# Patient Record
Sex: MALE | Race: WHITE | NOT HISPANIC OR LATINO | URBAN - METROPOLITAN AREA
[De-identification: names, ages, dates, MRNs, and addresses within clinical notes are randomized per-mention and may not be internally consistent; named-entity substitution may affect disease eponyms.]

---

## 2022-07-19 PROBLEM — E66.3 OVERWEIGHT: Status: ACTIVE | Noted: 2019-12-18

## 2023-08-17 ENCOUNTER — OFFICE (OUTPATIENT)
Dept: URBAN - METROPOLITAN AREA CLINIC 13 | Facility: CLINIC | Age: 38
End: 2023-08-17
Payer: COMMERCIAL

## 2023-08-17 VITALS
SYSTOLIC BLOOD PRESSURE: 116 MMHG | WEIGHT: 126 LBS | DIASTOLIC BLOOD PRESSURE: 70 MMHG | HEIGHT: 64 IN | HEART RATE: 96 BPM

## 2023-08-17 DIAGNOSIS — K50.80 CROHN'S DISEASE OF BOTH SMALL AND LARGE INTESTINE WITHOUT CO: ICD-10-CM

## 2023-08-17 PROCEDURE — 99205 OFFICE O/P NEW HI 60 MIN: CPT | Performed by: INTERNAL MEDICINE

## 2023-09-20 ENCOUNTER — OFFICE (OUTPATIENT)
Dept: URBAN - METROPOLITAN AREA INFUSION 6 | Facility: INFUSION | Age: 38
End: 2023-09-20
Payer: COMMERCIAL

## 2023-09-20 VITALS
DIASTOLIC BLOOD PRESSURE: 64 MMHG | DIASTOLIC BLOOD PRESSURE: 73 MMHG | SYSTOLIC BLOOD PRESSURE: 102 MMHG | DIASTOLIC BLOOD PRESSURE: 71 MMHG | TEMPERATURE: 97.9 F | HEART RATE: 94 BPM | DIASTOLIC BLOOD PRESSURE: 61 MMHG | TEMPERATURE: 99.1 F | SYSTOLIC BLOOD PRESSURE: 112 MMHG | HEART RATE: 86 BPM | SYSTOLIC BLOOD PRESSURE: 126 MMHG | HEIGHT: 64 IN | HEART RATE: 84 BPM | HEART RATE: 85 BPM | WEIGHT: 128 LBS

## 2023-09-20 DIAGNOSIS — K50.80 CROHN'S DISEASE OF BOTH SMALL AND LARGE INTESTINE WITHOUT CO: ICD-10-CM

## 2023-09-20 PROCEDURE — 96413 CHEMO IV INFUSION 1 HR: CPT | Performed by: INTERNAL MEDICINE

## 2023-09-20 RX ADMIN — VEDOLIZUMAB 300 MG: 300 INJECTION, POWDER, LYOPHILIZED, FOR SOLUTION INTRAVENOUS at 12:55

## 2023-09-20 NOTE — SERVICENOTES
Medications are buy and bill.  Entyvio welcome packet given to pt.  Wife accompanied him today.  All questions answered.

## 2023-10-04 ENCOUNTER — OFFICE (OUTPATIENT)
Dept: URBAN - METROPOLITAN AREA INFUSION 6 | Facility: INFUSION | Age: 38
End: 2023-10-04
Payer: COMMERCIAL

## 2023-10-04 VITALS
SYSTOLIC BLOOD PRESSURE: 123 MMHG | DIASTOLIC BLOOD PRESSURE: 72 MMHG | WEIGHT: 139 LBS | DIASTOLIC BLOOD PRESSURE: 58 MMHG | TEMPERATURE: 98.9 F | HEIGHT: 64 IN | DIASTOLIC BLOOD PRESSURE: 70 MMHG | HEART RATE: 103 BPM | HEART RATE: 109 BPM | TEMPERATURE: 99.1 F | SYSTOLIC BLOOD PRESSURE: 110 MMHG | HEART RATE: 108 BPM | SYSTOLIC BLOOD PRESSURE: 111 MMHG | TEMPERATURE: 98.7 F

## 2023-10-04 DIAGNOSIS — K50.80 CROHN'S DISEASE OF BOTH SMALL AND LARGE INTESTINE WITHOUT CO: ICD-10-CM

## 2023-10-04 PROCEDURE — 96413 CHEMO IV INFUSION 1 HR: CPT | Performed by: INTERNAL MEDICINE

## 2023-10-04 RX ADMIN — VEDOLIZUMAB 300 MG: 300 INJECTION, POWDER, LYOPHILIZED, FOR SOLUTION INTRAVENOUS at 14:10

## 2023-10-04 NOTE — SERVICENOTES
Medications are buy and bill.  States no side effects from first infusion but no improvements either. Is having marital problems and inquired about insurance change.  Explained the process to him and he verbalizes understanding.

## 2023-11-03 ENCOUNTER — OFFICE (OUTPATIENT)
Dept: URBAN - METROPOLITAN AREA INFUSION 6 | Facility: INFUSION | Age: 38
End: 2023-11-03
Payer: COMMERCIAL

## 2023-11-03 VITALS
TEMPERATURE: 98.9 F | HEIGHT: 64 IN | SYSTOLIC BLOOD PRESSURE: 113 MMHG | WEIGHT: 146 LBS | SYSTOLIC BLOOD PRESSURE: 118 MMHG | TEMPERATURE: 98.8 F | TEMPERATURE: 98.7 F | DIASTOLIC BLOOD PRESSURE: 70 MMHG | SYSTOLIC BLOOD PRESSURE: 137 MMHG | HEART RATE: 98 BPM | HEART RATE: 100 BPM | DIASTOLIC BLOOD PRESSURE: 80 MMHG | DIASTOLIC BLOOD PRESSURE: 68 MMHG

## 2023-11-03 DIAGNOSIS — K50.80 CROHN'S DISEASE OF BOTH SMALL AND LARGE INTESTINE WITHOUT CO: ICD-10-CM

## 2023-11-03 PROCEDURE — 96413 CHEMO IV INFUSION 1 HR: CPT | Performed by: INTERNAL MEDICINE

## 2023-11-03 RX ADMIN — VEDOLIZUMAB 300 MG: 300 INJECTION, POWDER, LYOPHILIZED, FOR SOLUTION INTRAVENOUS at 14:29

## 2023-11-04 LAB
C-REACTIVE PROTEIN: <0.3 MG/DL
CBC, PLATELET CT  AND  DIFF: ABS BASOPHIL: 0 K/UL
CBC, PLATELET CT  AND  DIFF: ABS EOSINOPHIL: 0.1 K/UL
CBC, PLATELET CT  AND  DIFF: ABS IMMATURE GRANS: 0.1 K/UL
CBC, PLATELET CT  AND  DIFF: ABS LYMPHOCYTE: 1.9 K/UL
CBC, PLATELET CT  AND  DIFF: ABS MONOCYTE: 0.8 K/UL
CBC, PLATELET CT  AND  DIFF: ABS NEUTROPHIL: 6.3 K/UL
CBC, PLATELET CT  AND  DIFF: BASOPHIL: 0.4 %
CBC, PLATELET CT  AND  DIFF: DIFFERENTIAL: (no result)
CBC, PLATELET CT  AND  DIFF: EOSINOPHIL: 1 %
CBC, PLATELET CT  AND  DIFF: HEMATOCRIT: 40.8 %
CBC, PLATELET CT  AND  DIFF: HEMOGLOBIN: 13.6 G/DL
CBC, PLATELET CT  AND  DIFF: IMMATURE GRANULOCYTES: 1.2 % — HIGH
CBC, PLATELET CT  AND  DIFF: LYMPHOCYTE: 20.9 %
CBC, PLATELET CT  AND  DIFF: MCH: 28.9 PG
CBC, PLATELET CT  AND  DIFF: MCHC: 33.3 G/DL
CBC, PLATELET CT  AND  DIFF: MCV: 86.8 FL
CBC, PLATELET CT  AND  DIFF: MONOCYTE: 8.2 %
CBC, PLATELET CT  AND  DIFF: MPV: 9.2 FL
CBC, PLATELET CT  AND  DIFF: NEUTROPHIL: 68.3 %
CBC, PLATELET CT  AND  DIFF: NRBCS: 0 /100 WBC
CBC, PLATELET CT  AND  DIFF: PLATELET COUNT: 360 K/UL
CBC, PLATELET CT  AND  DIFF: RBC: 4.7 M/UL
CBC, PLATELET CT  AND  DIFF: RDW: 13 %
CBC, PLATELET CT  AND  DIFF: WBC COUNT: 9.2 K/UL
COMPREHENSIVE METABOLIC PANEL: A/G RATIO: 1.4 RATIO
COMPREHENSIVE METABOLIC PANEL: ALBUMIN: 4.2 G/DL
COMPREHENSIVE METABOLIC PANEL: ALK PHOSPHATASE: 121 U/L
COMPREHENSIVE METABOLIC PANEL: ALT: 15 U/L
COMPREHENSIVE METABOLIC PANEL: AST: 23 U/L
COMPREHENSIVE METABOLIC PANEL: BILIRUBIN,TOTAL: 0.3 MG/DL
COMPREHENSIVE METABOLIC PANEL: BLOOD UREA NITROGEN: 17 MG/DL
COMPREHENSIVE METABOLIC PANEL: BUN/CREAT RATIO: 21
COMPREHENSIVE METABOLIC PANEL: CALCIUM: 9.4 MG/DL
COMPREHENSIVE METABOLIC PANEL: CHLORIDE: 104 MEQ/L
COMPREHENSIVE METABOLIC PANEL: CO2: 27 MEQ/L
COMPREHENSIVE METABOLIC PANEL: CREATININE: 0.8 MG/DL
COMPREHENSIVE METABOLIC PANEL: FASTING STATUS: (no result)
COMPREHENSIVE METABOLIC PANEL: GLOBULIN: 3.1 G/DL
COMPREHENSIVE METABOLIC PANEL: GLOMERULAR FILTRATION RATE (GFR): 117 MLS/MIN/1.73M2
COMPREHENSIVE METABOLIC PANEL: GLUCOSE,RANDOM: 113 MG/DL — HIGH
COMPREHENSIVE METABOLIC PANEL: POTASSIUM: 4.4 MEQ/L
COMPREHENSIVE METABOLIC PANEL: SODIUM: 140 MEQ/L
COMPREHENSIVE METABOLIC PANEL: TOTAL PROTEIN: 7.3 G/DL

## 2023-11-22 ENCOUNTER — OFFICE (OUTPATIENT)
Dept: URBAN - METROPOLITAN AREA CLINIC 13 | Facility: CLINIC | Age: 38
End: 2023-11-22
Payer: COMMERCIAL

## 2023-11-22 VITALS
DIASTOLIC BLOOD PRESSURE: 82 MMHG | SYSTOLIC BLOOD PRESSURE: 142 MMHG | HEIGHT: 64 IN | HEART RATE: 100 BPM | WEIGHT: 155 LBS

## 2023-11-22 DIAGNOSIS — K50.80 CROHN'S DISEASE OF BOTH SMALL AND LARGE INTESTINE WITHOUT CO: ICD-10-CM

## 2023-11-22 PROCEDURE — 99214 OFFICE O/P EST MOD 30 MIN: CPT | Performed by: INTERNAL MEDICINE

## 2024-01-05 ENCOUNTER — OFFICE (OUTPATIENT)
Dept: URBAN - METROPOLITAN AREA INFUSION 6 | Facility: INFUSION | Age: 39
End: 2024-01-05
Payer: COMMERCIAL

## 2024-01-05 VITALS
HEART RATE: 100 BPM | DIASTOLIC BLOOD PRESSURE: 82 MMHG | DIASTOLIC BLOOD PRESSURE: 65 MMHG | WEIGHT: 177 LBS | SYSTOLIC BLOOD PRESSURE: 127 MMHG | TEMPERATURE: 97.2 F | HEIGHT: 64 IN | SYSTOLIC BLOOD PRESSURE: 136 MMHG | HEART RATE: 101 BPM

## 2024-01-05 DIAGNOSIS — K50.80 CROHN'S DISEASE OF BOTH SMALL AND LARGE INTESTINE WITHOUT CO: ICD-10-CM

## 2024-01-05 PROBLEM — Z79.899 LONG-TERM USE OF HIGH-RISK MEDICATION: Status: ACTIVE | Noted: 2024-01-05

## 2024-01-05 PROCEDURE — 96413 CHEMO IV INFUSION 1 HR: CPT | Performed by: INTERNAL MEDICINE

## 2024-01-05 RX ADMIN — VEDOLIZUMAB 300 MG: 300 INJECTION, POWDER, LYOPHILIZED, FOR SOLUTION INTRAVENOUS at 14:12

## 2024-03-01 ENCOUNTER — OFFICE (OUTPATIENT)
Dept: URBAN - METROPOLITAN AREA INFUSION 6 | Facility: INFUSION | Age: 39
End: 2024-03-01
Payer: COMMERCIAL

## 2024-03-01 VITALS
HEART RATE: 99 BPM | SYSTOLIC BLOOD PRESSURE: 116 MMHG | WEIGHT: 208 LBS | HEART RATE: 85 BPM | TEMPERATURE: 98.6 F | SYSTOLIC BLOOD PRESSURE: 125 MMHG | HEIGHT: 64 IN | DIASTOLIC BLOOD PRESSURE: 61 MMHG | DIASTOLIC BLOOD PRESSURE: 71 MMHG

## 2024-03-01 DIAGNOSIS — K50.80 CROHN'S DISEASE OF BOTH SMALL AND LARGE INTESTINE WITHOUT CO: ICD-10-CM

## 2024-03-01 PROCEDURE — 96413 CHEMO IV INFUSION 1 HR: CPT | Performed by: INTERNAL MEDICINE

## 2024-03-01 RX ADMIN — VEDOLIZUMAB 300 MG: 300 INJECTION, POWDER, LYOPHILIZED, FOR SOLUTION INTRAVENOUS at 12:35

## 2024-04-26 ENCOUNTER — OFFICE (OUTPATIENT)
Dept: URBAN - METROPOLITAN AREA INFUSION 6 | Facility: INFUSION | Age: 39
End: 2024-04-26
Payer: COMMERCIAL

## 2024-04-26 VITALS
RESPIRATION RATE: 16 BRPM | DIASTOLIC BLOOD PRESSURE: 76 MMHG | SYSTOLIC BLOOD PRESSURE: 117 MMHG | SYSTOLIC BLOOD PRESSURE: 129 MMHG | DIASTOLIC BLOOD PRESSURE: 72 MMHG | WEIGHT: 207 LBS | HEART RATE: 88 BPM | TEMPERATURE: 98.9 F | HEART RATE: 80 BPM | HEIGHT: 64 IN

## 2024-04-26 DIAGNOSIS — K50.80 CROHN'S DISEASE OF BOTH SMALL AND LARGE INTESTINE WITHOUT CO: ICD-10-CM

## 2024-04-26 PROCEDURE — 96413 CHEMO IV INFUSION 1 HR: CPT | Performed by: INTERNAL MEDICINE

## 2024-04-26 RX ADMIN — VEDOLIZUMAB 300 MG: 300 INJECTION, POWDER, LYOPHILIZED, FOR SOLUTION INTRAVENOUS at 14:06

## 2024-04-29 ENCOUNTER — AMBULATORY SURGICAL CENTER (OUTPATIENT)
Dept: URBAN - METROPOLITAN AREA SURGERY 4 | Facility: SURGERY | Age: 39
End: 2024-04-29
Payer: COMMERCIAL

## 2024-04-29 VITALS
HEART RATE: 104 BPM | HEART RATE: 16 BPM | DIASTOLIC BLOOD PRESSURE: 83 MMHG | HEART RATE: 99 BPM | HEART RATE: 95 BPM | HEART RATE: 103 BPM | DIASTOLIC BLOOD PRESSURE: 79 MMHG | OXYGEN SATURATION: 97 % | DIASTOLIC BLOOD PRESSURE: 79 MMHG | RESPIRATION RATE: 14 BRPM | HEIGHT: 64 IN | RESPIRATION RATE: 17 BRPM | RESPIRATION RATE: 14 BRPM | OXYGEN SATURATION: 94 % | SYSTOLIC BLOOD PRESSURE: 160 MMHG | HEART RATE: 96 BPM | RESPIRATION RATE: 16 BRPM | SYSTOLIC BLOOD PRESSURE: 152 MMHG | DIASTOLIC BLOOD PRESSURE: 100 MMHG | SYSTOLIC BLOOD PRESSURE: 140 MMHG | OXYGEN SATURATION: 96 % | HEART RATE: 98 BPM | DIASTOLIC BLOOD PRESSURE: 82 MMHG | OXYGEN SATURATION: 98 % | HEART RATE: 98 BPM | OXYGEN SATURATION: 99 % | OXYGEN SATURATION: 97 % | RESPIRATION RATE: 17 BRPM | SYSTOLIC BLOOD PRESSURE: 134 MMHG | SYSTOLIC BLOOD PRESSURE: 147 MMHG | DIASTOLIC BLOOD PRESSURE: 84 MMHG | OXYGEN SATURATION: 96 % | DIASTOLIC BLOOD PRESSURE: 86 MMHG | TEMPERATURE: 97.5 F | SYSTOLIC BLOOD PRESSURE: 160 MMHG | RESPIRATION RATE: 19 BRPM | SYSTOLIC BLOOD PRESSURE: 136 MMHG | SYSTOLIC BLOOD PRESSURE: 149 MMHG | SYSTOLIC BLOOD PRESSURE: 136 MMHG | HEART RATE: 96 BPM | SYSTOLIC BLOOD PRESSURE: 147 MMHG | SYSTOLIC BLOOD PRESSURE: 140 MMHG | DIASTOLIC BLOOD PRESSURE: 77 MMHG | DIASTOLIC BLOOD PRESSURE: 100 MMHG | HEART RATE: 95 BPM | DIASTOLIC BLOOD PRESSURE: 83 MMHG | HEART RATE: 104 BPM | OXYGEN SATURATION: 98 % | HEIGHT: 64 IN | DIASTOLIC BLOOD PRESSURE: 82 MMHG | OXYGEN SATURATION: 99 % | SYSTOLIC BLOOD PRESSURE: 134 MMHG | SYSTOLIC BLOOD PRESSURE: 149 MMHG | DIASTOLIC BLOOD PRESSURE: 86 MMHG | RESPIRATION RATE: 16 BRPM | DIASTOLIC BLOOD PRESSURE: 84 MMHG | WEIGHT: 190 LBS | HEART RATE: 103 BPM | HEART RATE: 99 BPM | RESPIRATION RATE: 19 BRPM | HEART RATE: 16 BPM | DIASTOLIC BLOOD PRESSURE: 77 MMHG | SYSTOLIC BLOOD PRESSURE: 152 MMHG | WEIGHT: 190 LBS | OXYGEN SATURATION: 94 % | TEMPERATURE: 97.5 F

## 2024-04-29 DIAGNOSIS — K50.80 CROHN'S DISEASE OF BOTH SMALL AND LARGE INTESTINE WITHOUT CO: ICD-10-CM

## 2024-04-29 DIAGNOSIS — K57.30 DIVERTICULOSIS OF LARGE INTESTINE WITHOUT PERFORATION OR ABS: ICD-10-CM

## 2024-04-29 PROCEDURE — 45378 DIAGNOSTIC COLONOSCOPY: CPT | Performed by: INTERNAL MEDICINE

## 2024-04-29 NOTE — SERVICEHPINOTES
The patient presents for colonoscopic evaluation of    Crohn's disease  .   He has had surgery for Crohn's disease and this is a follow-up colonoscopy to assess disease activity.

## 2024-06-21 ENCOUNTER — OFFICE (OUTPATIENT)
Dept: URBAN - METROPOLITAN AREA INFUSION 6 | Facility: INFUSION | Age: 39
End: 2024-06-21
Payer: COMMERCIAL

## 2024-06-21 VITALS
DIASTOLIC BLOOD PRESSURE: 88 MMHG | HEART RATE: 60 BPM | DIASTOLIC BLOOD PRESSURE: 78 MMHG | WEIGHT: 204 LBS | TEMPERATURE: 98 F | SYSTOLIC BLOOD PRESSURE: 156 MMHG | RESPIRATION RATE: 16 BRPM | SYSTOLIC BLOOD PRESSURE: 113 MMHG | HEIGHT: 64 IN | HEART RATE: 87 BPM

## 2024-06-21 DIAGNOSIS — K50.80 CROHN'S DISEASE OF BOTH SMALL AND LARGE INTESTINE WITHOUT CO: ICD-10-CM

## 2024-06-21 PROCEDURE — 96413 CHEMO IV INFUSION 1 HR: CPT | Performed by: INTERNAL MEDICINE

## 2024-06-21 RX ADMIN — VEDOLIZUMAB 300 MG: 300 INJECTION, POWDER, LYOPHILIZED, FOR SOLUTION INTRAVENOUS at 14:31

## 2024-10-18 PROBLEM — Z11.59 NEED FOR HEPATITIS B SCREENING TEST: Status: ACTIVE | Noted: 2024-10-18

## 2024-11-12 PROBLEM — E66.3 OVERWEIGHT: Status: ACTIVE | Noted: 2019-12-18

## 2024-12-13 ENCOUNTER — OFFICE (OUTPATIENT)
Dept: URBAN - METROPOLITAN AREA INFUSION 6 | Facility: INFUSION | Age: 39
End: 2024-12-13
Payer: COMMERCIAL

## 2024-12-13 VITALS
HEIGHT: 64 IN | HEART RATE: 100 BPM | WEIGHT: 225.8 LBS | RESPIRATION RATE: 16 BRPM | HEART RATE: 98 BPM | SYSTOLIC BLOOD PRESSURE: 131 MMHG | DIASTOLIC BLOOD PRESSURE: 72 MMHG | SYSTOLIC BLOOD PRESSURE: 130 MMHG | DIASTOLIC BLOOD PRESSURE: 75 MMHG | TEMPERATURE: 97.9 F

## 2024-12-13 DIAGNOSIS — K50.80 CROHN'S DISEASE OF BOTH SMALL AND LARGE INTESTINE WITHOUT CO: ICD-10-CM

## 2024-12-13 PROCEDURE — 96413 CHEMO IV INFUSION 1 HR: CPT | Performed by: INTERNAL MEDICINE

## 2024-12-13 RX ADMIN — VEDOLIZUMAB 300 MG: 300 INJECTION, POWDER, LYOPHILIZED, FOR SOLUTION INTRAVENOUS at 14:22

## 2025-02-07 ENCOUNTER — OFFICE (OUTPATIENT)
Dept: URBAN - METROPOLITAN AREA INFUSION 6 | Facility: INFUSION | Age: 40
End: 2025-02-07
Payer: COMMERCIAL

## 2025-02-07 VITALS
HEART RATE: 101 BPM | SYSTOLIC BLOOD PRESSURE: 146 MMHG | SYSTOLIC BLOOD PRESSURE: 158 MMHG | HEIGHT: 64 IN | TEMPERATURE: 98.8 F | DIASTOLIC BLOOD PRESSURE: 99 MMHG | HEART RATE: 96 BPM | WEIGHT: 153 LBS | DIASTOLIC BLOOD PRESSURE: 94 MMHG

## 2025-02-07 DIAGNOSIS — K50.80 CROHN'S DISEASE OF BOTH SMALL AND LARGE INTESTINE WITHOUT CO: ICD-10-CM

## 2025-02-07 PROCEDURE — 96413 CHEMO IV INFUSION 1 HR: CPT | Performed by: INTERNAL MEDICINE

## 2025-02-07 RX ADMIN — VEDOLIZUMAB 300 MG: 300 INJECTION, POWDER, LYOPHILIZED, FOR SOLUTION INTRAVENOUS at 14:14

## 2025-02-08 LAB
C-REACTIVE PROTEIN: 0.49 MG/DL
CBC, PLATELET CT  AND  DIFF: ABS BASOPHIL: 0 K/UL
CBC, PLATELET CT  AND  DIFF: ABS EOSINOPHIL: 0.2 K/UL
CBC, PLATELET CT  AND  DIFF: ABS IMMATURE GRANS: 0 K/UL
CBC, PLATELET CT  AND  DIFF: ABS LYMPHOCYTE: 1.8 K/UL
CBC, PLATELET CT  AND  DIFF: ABS MONOCYTE: 0.5 K/UL
CBC, PLATELET CT  AND  DIFF: ABS NEUTROPHIL: 3.9 K/UL
CBC, PLATELET CT  AND  DIFF: BASOPHIL: 0.3 %
CBC, PLATELET CT  AND  DIFF: DIFFERENTIAL: (no result)
CBC, PLATELET CT  AND  DIFF: EOSINOPHIL: 2.3 %
CBC, PLATELET CT  AND  DIFF: HEMATOCRIT: 39.5 %
CBC, PLATELET CT  AND  DIFF: HEMOGLOBIN: 13.1 G/DL
CBC, PLATELET CT  AND  DIFF: IMMATURE GRANULOCYTES: 0.6 %
CBC, PLATELET CT  AND  DIFF: LYMPHOCYTE: 28.2 %
CBC, PLATELET CT  AND  DIFF: MCH: 28.8 PG
CBC, PLATELET CT  AND  DIFF: MCHC: 33.2 G/DL
CBC, PLATELET CT  AND  DIFF: MCV: 86.8 FL
CBC, PLATELET CT  AND  DIFF: MONOCYTE: 8.1 %
CBC, PLATELET CT  AND  DIFF: MPV: 9.8 FL
CBC, PLATELET CT  AND  DIFF: NEUTROPHIL: 60.5 %
CBC, PLATELET CT  AND  DIFF: NRBCS: 0 /100 WBC
CBC, PLATELET CT  AND  DIFF: PLATELET COUNT: 329 K/UL
CBC, PLATELET CT  AND  DIFF: RBC: 4.55 M/UL
CBC, PLATELET CT  AND  DIFF: RDW: 13 %
CBC, PLATELET CT  AND  DIFF: WBC COUNT: 6.4 K/UL
COMPREHENSIVE METABOLIC PANEL: A/G RATIO: 1.2 RATIO
COMPREHENSIVE METABOLIC PANEL: ALBUMIN: 4.2 G/DL
COMPREHENSIVE METABOLIC PANEL: ALK PHOSPHATASE: 155 U/L — HIGH
COMPREHENSIVE METABOLIC PANEL: ALT: 47 U/L
COMPREHENSIVE METABOLIC PANEL: AST: 41 U/L
COMPREHENSIVE METABOLIC PANEL: BILIRUBIN,TOTAL: 0.6 MG/DL
COMPREHENSIVE METABOLIC PANEL: BLOOD UREA NITROGEN: 6 MG/DL
COMPREHENSIVE METABOLIC PANEL: BUN/CREAT RATIO: 9
COMPREHENSIVE METABOLIC PANEL: CALCIUM: 9 MG/DL
COMPREHENSIVE METABOLIC PANEL: CHLORIDE: 104 MEQ/L
COMPREHENSIVE METABOLIC PANEL: CO2: 23 MEQ/L
COMPREHENSIVE METABOLIC PANEL: CREATININE: 0.7 MG/DL
COMPREHENSIVE METABOLIC PANEL: FASTING STATUS: (no result)
COMPREHENSIVE METABOLIC PANEL: GLOBULIN: 3.4 G/DL
COMPREHENSIVE METABOLIC PANEL: GLOMERULAR FILTRATION RATE (GFR): 121 MLS/MIN/1.73M2
COMPREHENSIVE METABOLIC PANEL: GLUCOSE,RANDOM: 100 MG/DL — HIGH
COMPREHENSIVE METABOLIC PANEL: POTASSIUM: 4.2 MEQ/L
COMPREHENSIVE METABOLIC PANEL: SODIUM: 138 MEQ/L
COMPREHENSIVE METABOLIC PANEL: TOTAL PROTEIN: 7.6 G/DL
VITAMIN B12: 342 PG/ML

## 2025-04-04 ENCOUNTER — OFFICE (OUTPATIENT)
Dept: URBAN - METROPOLITAN AREA INFUSION 6 | Facility: INFUSION | Age: 40
End: 2025-04-04
Payer: COMMERCIAL

## 2025-04-04 VITALS
HEIGHT: 64 IN | TEMPERATURE: 99.2 F | WEIGHT: 222.5 LBS | DIASTOLIC BLOOD PRESSURE: 100 MMHG | HEART RATE: 121 BPM | HEART RATE: 111 BPM | SYSTOLIC BLOOD PRESSURE: 157 MMHG | SYSTOLIC BLOOD PRESSURE: 147 MMHG | DIASTOLIC BLOOD PRESSURE: 87 MMHG

## 2025-04-04 DIAGNOSIS — K50.80 CROHN'S DISEASE OF BOTH SMALL AND LARGE INTESTINE WITHOUT CO: ICD-10-CM

## 2025-04-04 PROBLEM — Z51.81 THERAPEUTIC DRUG MONITORING: Status: ACTIVE | Noted: 2025-04-04

## 2025-04-04 PROCEDURE — 96413 CHEMO IV INFUSION 1 HR: CPT | Performed by: INTERNAL MEDICINE

## 2025-04-04 RX ADMIN — VEDOLIZUMAB 300 MG: 300 INJECTION, POWDER, LYOPHILIZED, FOR SOLUTION INTRAVENOUS at 14:32

## 2025-04-10 LAB
VEDOLIZUMAB QN WITH ANTIBODIES,S: INTERPRETATION: (no result)
VEDOLIZUMAB QN WITH ANTIBODIES,S: VEDOLIZUMAB AB,S: <9.8 NG/ML
VEDOLIZUMAB QN WITH ANTIBODIES,S: VEDOLIZUMAB QN,S: 5.5 MCG/ML — LOW

## 2025-05-19 ENCOUNTER — OFFICE (OUTPATIENT)
Dept: URBAN - METROPOLITAN AREA CLINIC 13 | Facility: CLINIC | Age: 40
End: 2025-05-19
Payer: COMMERCIAL

## 2025-05-19 VITALS
WEIGHT: 217 LBS | HEART RATE: 81 BPM | DIASTOLIC BLOOD PRESSURE: 82 MMHG | HEIGHT: 64 IN | SYSTOLIC BLOOD PRESSURE: 148 MMHG

## 2025-05-19 DIAGNOSIS — Z79.899 OTHER LONG TERM (CURRENT) DRUG THERAPY: ICD-10-CM

## 2025-05-19 DIAGNOSIS — K50.80 CROHN'S DISEASE OF BOTH SMALL AND LARGE INTESTINE WITHOUT CO: ICD-10-CM

## 2025-05-19 DIAGNOSIS — K76.0 FATTY (CHANGE OF) LIVER, NOT ELSEWHERE CLASSIFIED: ICD-10-CM

## 2025-05-19 PROCEDURE — 99214 OFFICE O/P EST MOD 30 MIN: CPT | Performed by: INTERNAL MEDICINE

## 2025-07-11 ENCOUNTER — OFFICE (OUTPATIENT)
Dept: URBAN - METROPOLITAN AREA INFUSION 6 | Facility: INFUSION | Age: 40
End: 2025-07-11
Payer: COMMERCIAL

## 2025-07-11 VITALS
SYSTOLIC BLOOD PRESSURE: 130 MMHG | HEART RATE: 94 BPM | TEMPERATURE: 98.3 F | DIASTOLIC BLOOD PRESSURE: 82 MMHG | HEIGHT: 64 IN | HEART RATE: 99 BPM | DIASTOLIC BLOOD PRESSURE: 78 MMHG | SYSTOLIC BLOOD PRESSURE: 131 MMHG | WEIGHT: 211.6 LBS

## 2025-07-11 DIAGNOSIS — K50.80 CROHN'S DISEASE OF BOTH SMALL AND LARGE INTESTINE WITHOUT CO: ICD-10-CM

## 2025-07-11 PROCEDURE — 96413 CHEMO IV INFUSION 1 HR: CPT | Performed by: INTERNAL MEDICINE

## 2025-07-11 RX ADMIN — VEDOLIZUMAB 300 MG: 300 INJECTION, POWDER, LYOPHILIZED, FOR SOLUTION INTRAVENOUS at 13:58

## 2025-08-22 ENCOUNTER — OFFICE (OUTPATIENT)
Dept: URBAN - METROPOLITAN AREA INFUSION 6 | Facility: INFUSION | Age: 40
End: 2025-08-22
Payer: COMMERCIAL

## 2025-08-22 VITALS
TEMPERATURE: 98 F | HEIGHT: 64 IN | DIASTOLIC BLOOD PRESSURE: 94 MMHG | SYSTOLIC BLOOD PRESSURE: 146 MMHG | HEART RATE: 104 BPM | HEART RATE: 98 BPM | WEIGHT: 194.12 LBS | SYSTOLIC BLOOD PRESSURE: 132 MMHG | DIASTOLIC BLOOD PRESSURE: 85 MMHG

## 2025-08-22 DIAGNOSIS — K50.80 CROHN'S DISEASE OF BOTH SMALL AND LARGE INTESTINE WITHOUT CO: ICD-10-CM

## 2025-08-22 DIAGNOSIS — Z11.1 ENCOUNTER FOR SCREENING FOR RESPIRATORY TUBERCULOSIS: ICD-10-CM

## 2025-08-22 PROCEDURE — 96413 CHEMO IV INFUSION 1 HR: CPT | Performed by: INTERNAL MEDICINE

## 2025-08-22 RX ADMIN — VEDOLIZUMAB 300 MG: 300 INJECTION, POWDER, LYOPHILIZED, FOR SOLUTION INTRAVENOUS at 13:55

## 2025-08-24 LAB
C-REACTIVE PROTEIN: <0.3 MG/DL
CBC, PLATELET CT  AND  DIFF: ABS BASOPHIL: 0 K/UL
CBC, PLATELET CT  AND  DIFF: ABS EOSINOPHIL: 0.1 K/UL
CBC, PLATELET CT  AND  DIFF: ABS IMMATURE GRANS: 0 K/UL
CBC, PLATELET CT  AND  DIFF: ABS LYMPHOCYTE: 1.3 K/UL
CBC, PLATELET CT  AND  DIFF: ABS MONOCYTE: 0.4 K/UL
CBC, PLATELET CT  AND  DIFF: ABS NEUTROPHIL: 4.4 K/UL
CBC, PLATELET CT  AND  DIFF: BASOPHIL: 0.3 %
CBC, PLATELET CT  AND  DIFF: DIFFERENTIAL: (no result)
CBC, PLATELET CT  AND  DIFF: EOSINOPHIL: 1.1 %
CBC, PLATELET CT  AND  DIFF: HEMATOCRIT: 43.1 %
CBC, PLATELET CT  AND  DIFF: HEMOGLOBIN: 14.3 G/DL
CBC, PLATELET CT  AND  DIFF: IMMATURE GRANULOCYTES: 0.2 %
CBC, PLATELET CT  AND  DIFF: LYMPHOCYTE: 21 %
CBC, PLATELET CT  AND  DIFF: MCH: 27.8 PG
CBC, PLATELET CT  AND  DIFF: MCHC: 33.2 G/DL
CBC, PLATELET CT  AND  DIFF: MCV: 83.9 FL
CBC, PLATELET CT  AND  DIFF: MONOCYTE: 6.3 %
CBC, PLATELET CT  AND  DIFF: MPV: 9.8 FL
CBC, PLATELET CT  AND  DIFF: NEUTROPHIL: 71.1 %
CBC, PLATELET CT  AND  DIFF: NRBCS: 0 /100 WBC
CBC, PLATELET CT  AND  DIFF: PLATELET COUNT: 327 K/UL
CBC, PLATELET CT  AND  DIFF: RBC: 5.14 M/UL
CBC, PLATELET CT  AND  DIFF: RDW: 13.6 %
CBC, PLATELET CT  AND  DIFF: WBC COUNT: 6.2 K/UL
COMPREHENSIVE METABOLIC PANEL: A/G RATIO: 1.4 RATIO
COMPREHENSIVE METABOLIC PANEL: ALBUMIN: 4.4 G/DL
COMPREHENSIVE METABOLIC PANEL: ALK PHOSPHATASE: 149 U/L — HIGH
COMPREHENSIVE METABOLIC PANEL: ALT: 216 U/L — HIGH
COMPREHENSIVE METABOLIC PANEL: AST: 77 U/L — HIGH
COMPREHENSIVE METABOLIC PANEL: BILIRUBIN,TOTAL: 0.5 MG/DL
COMPREHENSIVE METABOLIC PANEL: BLOOD UREA NITROGEN: 8 MG/DL
COMPREHENSIVE METABOLIC PANEL: BUN/CREAT RATIO: 13
COMPREHENSIVE METABOLIC PANEL: CALCIUM: 9.6 MG/DL
COMPREHENSIVE METABOLIC PANEL: CHLORIDE: 103 MEQ/L
COMPREHENSIVE METABOLIC PANEL: CO2: 25 MEQ/L
COMPREHENSIVE METABOLIC PANEL: CREATININE: 0.6 MG/DL
COMPREHENSIVE METABOLIC PANEL: FASTING STATUS: (no result)
COMPREHENSIVE METABOLIC PANEL: GLOBULIN: 3.2 G/DL
COMPREHENSIVE METABOLIC PANEL: GLOMERULAR FILTRATION RATE (GFR): 127 MLS/MIN/1.73M2
COMPREHENSIVE METABOLIC PANEL: GLUCOSE,RANDOM: 117 MG/DL — HIGH
COMPREHENSIVE METABOLIC PANEL: POTASSIUM: 3.9 MEQ/L
COMPREHENSIVE METABOLIC PANEL: SODIUM: 139 MEQ/L
COMPREHENSIVE METABOLIC PANEL: TOTAL PROTEIN: 7.6 G/DL
T SPOT: NIL (NEG) CONTROL: 0
T SPOT: PANEL A: 0
T SPOT: PANEL B: 0
T SPOT: POSITIVE CONTROL: 0
T SPOT: T-SPOT: ABNORMAL

## 2025-08-25 PROBLEM — K50.80 CROHN'S DISEASE: Status: ACTIVE | Noted: 2024-04-29
